# Patient Record
Sex: MALE | Race: WHITE | NOT HISPANIC OR LATINO | Employment: OTHER | URBAN - METROPOLITAN AREA
[De-identification: names, ages, dates, MRNs, and addresses within clinical notes are randomized per-mention and may not be internally consistent; named-entity substitution may affect disease eponyms.]

---

## 2017-05-28 DIAGNOSIS — I10 ESSENTIAL HYPERTENSION, BENIGN: ICD-10-CM

## 2017-05-30 RX ORDER — LISINOPRIL AND HYDROCHLOROTHIAZIDE 12.5; 1 MG/1; MG/1
TABLET ORAL
Qty: 90 TABLET | Refills: 2 | Status: SHIPPED | OUTPATIENT
Start: 2017-05-30

## 2017-08-16 DIAGNOSIS — E03.9 ACQUIRED HYPOTHYROIDISM: ICD-10-CM

## 2017-08-18 RX ORDER — LEVOTHYROXINE SODIUM 0.12 MG/1
TABLET ORAL
Qty: 90 TABLET | Refills: 0 | Status: SHIPPED | OUTPATIENT
Start: 2017-08-18

## 2019-10-20 ENCOUNTER — HOSPITAL ENCOUNTER (EMERGENCY)
Facility: HOSPITAL | Age: 69
End: 2019-10-20

## 2019-10-20 VITALS
HEART RATE: 110 BPM | SYSTOLIC BLOOD PRESSURE: 176 MMHG | WEIGHT: 310 LBS | BODY MASS INDEX: 44.86 KG/M2 | RESPIRATION RATE: 20 BRPM | OXYGEN SATURATION: 97 % | DIASTOLIC BLOOD PRESSURE: 75 MMHG | TEMPERATURE: 98.3 F

## 2022-08-17 ENCOUNTER — TELEPHONE (OUTPATIENT)
Dept: RADIATION ONCOLOGY | Facility: HOSPITAL | Age: 72
End: 2022-08-17

## 2022-08-18 ENCOUNTER — OFFICE VISIT (OUTPATIENT)
Dept: RADIATION ONCOLOGY | Facility: HOSPITAL | Age: 72
End: 2022-08-18

## 2022-08-18 ENCOUNTER — HOSPITAL ENCOUNTER (OUTPATIENT)
Dept: RADIATION ONCOLOGY | Facility: HOSPITAL | Age: 72
Setting detail: RADIATION/ONCOLOGY SERIES
Discharge: HOME | End: 2022-08-18

## 2022-08-18 VITALS
SYSTOLIC BLOOD PRESSURE: 135 MMHG | RESPIRATION RATE: 12 BRPM | WEIGHT: 189.8 LBS | OXYGEN SATURATION: 98 % | TEMPERATURE: 98 F | BODY MASS INDEX: 25.71 KG/M2 | HEIGHT: 72 IN | DIASTOLIC BLOOD PRESSURE: 80 MMHG | HEART RATE: 81 BPM

## 2022-08-18 DIAGNOSIS — C61 PROSTATE CANCER: Primary | ICD-10-CM

## 2022-08-18 PROCEDURE — G0463 HOSPITAL OUTPT CLINIC VISIT: HCPCS

## 2022-08-18 RX ORDER — MELOXICAM 15 MG/1
15 TABLET ORAL DAILY
COMMUNITY
Start: 2022-07-07

## 2022-08-18 NOTE — PROGRESS NOTES
CONSULTATION NOTE      :                                                          1950  DATE OF CONSULTATION:                       2022   REQUESTING PHYSICIAN:                   Sin Johnston MD  REASON FOR CONSULTATION:           Prostate cancer (HCC)  - Stage I (cT1c, cN0, cM0, PSA: 4.3, Grade Group: 1)         BRIEF HISTORY:  The patient is a very pleasant 72 y.o. male  with recent diagnosis of prostate cancer.  He presented with slowly rising PSA values increasing from 3.35 ng/ml on 2021, 4.85 ng/ml on 3/16/2022 and 4.33 ng/ml on 6/3/2022.  He has moderate urinary outflow obstructive symptoms.   MRI 2022 showed BPH with prostate volume estimated 45 cc.  There was a PI-RADS 4 index nodule in the right posterior lateral mid peripheral zone assuring 12 mm diameter.  There was a PI-RADS 3 index lesion in the right posterior medial peripheral zone measuring 6 mm diameter.  Biopsy was performed 2022.  MRI targeted biopsies of the 2 regions of interest were negative for neoplasm.  Random samples from the left lobe showed Shilpi's 3+3=6 in 1 of 6 cores with tumor occupying less than 5% of submitted tissue.  Random samples from the right lobe contained [place]'s 3+3=6 involving 2 out of 6 cores, 10% submitted tissue.  There was no evidence of perineural invasion or lymphovascular invasion.  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  He has longevity in his family with his mother doing well at age 94.  He was certainly have longevity much greater than 10 years to warrant definitive treatment, which he wishes to pursue.  He is interested in nonsurgical treatment approach.      Allergy:   Allergies   Allergen Reactions   • Penicillins Rash       Social History:   Social History     Socioeconomic History   • Marital status:    Tobacco Use   • Smoking status: Former Smoker   • Smokeless tobacco: Current User     Types: Chew   Vaping Use   • Vaping Use: Never used  "  Substance and Sexual Activity   • Alcohol use: Yes     Comment: 3 drinks per week   • Drug use: Defer   • Sexual activity: Defer       Past Medical History:   Past Medical History:   Diagnosis Date   • Acquired hypothyroidism     unspecified cause   • Essential hypertension     benign   • H/O colonoscopy 2008    normal results   • Pneumococcal vaccination given 2011   • Shingles 2011    vaccine   • Vaccine for tetanus toxoid 2011       Family History: family history includes Diabetes in his maternal grandmother; Heart attack in his father; Heart disease in his father.     Surgical History:   Past Surgical History:   Procedure Laterality Date   • COLONOSCOPY  2019   • KNEE SURGERY Right 1971   • PROSTATE BIOPSY          Review of Systems:   Review of Systems   Musculoskeletal: Positive for arthralgias.   All other systems reviewed and are negative.          Objective   VITAL SIGNS:   Vitals:    08/18/22 0931   BP: 135/80   Pulse: 81   Resp: 12   Temp: 98 °F (36.7 °C)   SpO2: 98%  Comment: RA   Weight: 86.1 kg (189 lb 12.8 oz)   Height: 182.9 cm (72\")   PainSc: 0-No pain        Karnofsky score: 90       Physical Exam:   Physical Exam  Vitals and nursing note reviewed.   Constitutional:       Appearance: He is well-developed.   HENT:      Head: Normocephalic and atraumatic.   Cardiovascular:      Rate and Rhythm: Normal rate and regular rhythm.      Heart sounds: Normal heart sounds. No murmur heard.  Pulmonary:      Effort: Pulmonary effort is normal.      Breath sounds: Normal breath sounds. No wheezing or rales.   Chest:   Breasts:      Right: No supraclavicular adenopathy.      Left: No supraclavicular adenopathy.       Abdominal:      General: Bowel sounds are normal. There is no distension.      Palpations: Abdomen is soft.      Tenderness: There is no abdominal tenderness.   Genitourinary:     Prostate: Enlarged ( 50 to 60 cc, smooth, symmetric enlarged.  No induration or nodules.  Seminal vesicles are " nonpalpable.). Not tender and no nodules present.      Rectum: No mass, tenderness, external hemorrhoid or internal hemorrhoid. Normal anal tone.   Musculoskeletal:         General: No tenderness. Normal range of motion.      Cervical back: Normal range of motion and neck supple.   Lymphadenopathy:      Cervical: No cervical adenopathy.      Upper Body:      Right upper body: No supraclavicular adenopathy.      Left upper body: No supraclavicular adenopathy.   Skin:     General: Skin is warm and dry.   Neurological:      Mental Status: He is alert and oriented to person, place, and time.      Sensory: No sensory deficit.   Psychiatric:         Behavior: Behavior normal.         Thought Content: Thought content normal.         Judgment: Judgment normal.                 IPSS Questionnaire (AUA-7):  Over the past month…    1)  Incomplete Emptying  How often have you had a sensation of not emptying your bladder?  0 - Not at all   2)  Frequency  How often have you had to urinate less than every two hours? 1 - Less than 1 time in 5   3)  Intermittency  How often have you found you stopped and started again several times when you urinated?  1 - Less than 1 time in 5   4) Urgency  How often have you found it difficult to postpone urination?  0 - Not at all   5) Weak Stream  How often have you had a weak urinary stream?  0 - Not at all   6) Straining  How often have you had to push or strain to begin urination?  0 - Not at all   7) Nocturia  How many times did you typically get up at night to urinate?  1 - 1 time   Total Score:  3       Quality of life due to urinary symptoms:  If you were to spend the rest of your life with your urinary condition the way it is now, how would you feel about that? 3-Mixed   Urine Leakage (Incontinence) 0-No Leakage     Sexual Health Inventory  Current Status    1)  How do you rate your confidence that you could achieve and keep an erection? 4-High   2) When you had erections with sexual  stimulation, how often were your erections hard enough for penetration (entering your partner)? 5-Almost always or always   3)  During sexual intercourse, how often were you able to maintain your erection after you had penetrated (entered) into your partner? 5-Almost always or always   4) During sexual intercourse, how difficult was it to maintain your erection to completion of intercourse? 5-Not difficult   5) When you attempted sexual intercourse, how often was it satisfactory to you? 5-Almost always or always   Total Score: 24       Bowel Health Inventory  Current Status: 0-No problems, no rectal bleeding, no discharge, less then 5 bowel movements a day           The following portions of the patient's history were reviewed and updated as appropriate: allergies, current medications, past family history, past medical history, past social history, past surgical history and problem list.    Assessment:   Assessment      Prostate cancer, Shilpi's 3+3=6, clinical stage I (T1c, N0, M0, PSA 4.33 ng/ml.  He has low risk disease and prognosis should be excellent.  He is not comfortable with active surveillance and wishes to pursue definitive treatment.  We reviewed the radiotherapy treatment options of stereotactic body radiotherapy versus intensity modulated radiotherapy versus brachytherapy.  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  We reviewed the CyberKnife treatment procedure in detail today.  Informed consent was obtained.    RECOMMENDATIONS: We will arrange for placement of gold seed fiducials in early November 2022.  Patient will subsequently return here for treatment planning CT and MRI pelvis.  The prostate gland will receive 5 daily fractions of 7 Hines each, delivered on the CyberKnife.  He will likely receive treatment shortly after Thanksgiving holiday week.      I spent a total of 55 minutes on todays visit, with more than 45 minutes in  direct face to face communication, and the remainder of the time spent in reviewing the relevant history, records, available imaging, and for documentation.    Follow Up:   Return in about 3 months (around 11/18/2022) for Office Visit, Simulation.  Diagnoses and all orders for this visit:    1. Prostate cancer (HCC) (Primary)  -     Ambulatory Referral to Urology         Terry Andrews MD

## 2022-09-13 ENCOUNTER — OFFICE VISIT (OUTPATIENT)
Dept: UROLOGY | Facility: CLINIC | Age: 72
End: 2022-09-13

## 2022-09-13 VITALS — HEIGHT: 72 IN | BODY MASS INDEX: 25.6 KG/M2 | WEIGHT: 189 LBS

## 2022-09-13 DIAGNOSIS — C61 PROSTATE CANCER: Primary | ICD-10-CM

## 2022-09-13 PROCEDURE — 99204 OFFICE O/P NEW MOD 45 MIN: CPT | Performed by: UROLOGY

## 2022-09-13 RX ORDER — CIPROFLOXACIN 750 MG/1
750 TABLET, FILM COATED ORAL 2 TIMES DAILY
Qty: 6 TABLET | Refills: 0 | Status: SHIPPED | OUTPATIENT
Start: 2022-09-13 | End: 2022-11-02

## 2022-09-13 NOTE — PROGRESS NOTES
Prostate Cancer Office Visit      Patient Name: Michael Simon  : 1950   MRN: [id number]     Chief Complaint:  Prostate Cancer.     History of Present Illness: Michael Simon is a 72 y.o. male who presents today with recently diagnosed prostate cancer.  The patient has been diagnosed with grade group 1, Shilpi 3+3 equal 6 prostate cancer.  Prebiopsy PSA 4.3.  The patient desires definitive therapy.  He presents today as a referral from Dr. Terry Andrews for to discuss fiducial marker placement prior to CyberKnife radiotherapy.      Subjective      Review of System: Review of Systems   Constitutional: Negative for chills, fatigue, fever and unexpected weight change.   HENT: Negative for sore throat.    Eyes: Negative for visual disturbance.   Respiratory: Negative for cough, chest tightness and shortness of breath.    Cardiovascular: Negative for chest pain and leg swelling.   Gastrointestinal: Negative for blood in stool, constipation, diarrhea, nausea, rectal pain and vomiting.   Genitourinary: Negative for decreased urine volume, difficulty urinating, dysuria, enuresis, flank pain, frequency, genital sores, hematuria and urgency.   Musculoskeletal: Negative for back pain and joint swelling.   Skin: Negative for rash and wound.   Neurological: Negative for seizures, speech difficulty, weakness and headaches.   Psychiatric/Behavioral: Negative for confusion, sleep disturbance and suicidal ideas. The patient is not nervous/anxious.       I have reviewed the ROS documented by my clinical staff, I have updated appropriately and I agree. Brendan Hernández MD    Past Medical History:   Past Medical History:   Diagnosis Date   • Acquired hypothyroidism     unspecified cause   • Elevated PSA    • Essential hypertension     benign   • H/O colonoscopy     normal results   • Kidney stone    • Pneumococcal vaccination given    • Prostate cancer (HCC)    • Shingles 2011    vaccine   •  "Vaccine for tetanus toxoid        Past Surgical History:   Past Surgical History:   Procedure Laterality Date   • COLONOSCOPY  2019   • KNEE SURGERY Right 1971   • PROSTATE BIOPSY         Family History:   Family History   Problem Relation Age of Onset   • Heart attack Father    • Heart disease Father    • Diabetes Maternal Grandmother         type 2       Social History:   Social History     Socioeconomic History   • Marital status:    Tobacco Use   • Smoking status: Former Smoker     Years: 3.00     Start date: 1969     Quit date: 1972     Years since quittin.7   • Smokeless tobacco: Current User     Types: Chew   Vaping Use   • Vaping Use: Never used   Substance and Sexual Activity   • Alcohol use: Yes     Alcohol/week: 3.0 standard drinks     Types: 2 Cans of beer, 1 Drinks containing 0.5 oz of alcohol per week     Comment: 3 drinks per week   • Drug use: Never   • Sexual activity: Yes     Partners: Female       Medications:     Current Outpatient Medications:   •  levothyroxine (SYNTHROID, LEVOTHROID) 125 MCG tablet, TAKE 1 TABLET BY MOUTH DAILY., Disp: 90 tablet, Rfl: 0  •  lisinopril-hydrochlorothiazide (PRINZIDE,ZESTORETIC) 10-12.5 MG per tablet, TAKE 1 TABLET BY MOUTH EVERY DAY, Disp: 90 tablet, Rfl: 2  •  meloxicam (MOBIC) 15 MG tablet, Take 15 mg by mouth Daily., Disp: , Rfl:   •  ciprofloxacin (CIPRO) 750 MG tablet, Take 1 tablet by mouth 2 (Two) Times a Day., Disp: 6 tablet, Rfl: 0    Allergies:   Allergies   Allergen Reactions   • Penicillins Rash       Objective     Physical Exam:   Vital Signs:   Vitals:    22 1614   Weight: 85.7 kg (189 lb)   Height: 182.9 cm (72.01\")   PainSc: 0-No pain     Body mass index is 25.63 kg/m².     Physical Exam  Vitals and nursing note reviewed.   Constitutional:       Appearance: Normal appearance.   HENT:      Head: Normocephalic and atraumatic.   Cardiovascular:      Comments: Well perfused  Pulmonary:      Effort: Pulmonary effort is " normal.   Abdominal:      General: Abdomen is flat.      Palpations: Abdomen is soft.   Musculoskeletal:         General: Normal range of motion.   Skin:     General: Skin is warm and dry.   Neurological:      General: No focal deficit present.      Mental Status: He is alert and oriented to person, place, and time. Mental status is at baseline.   Psychiatric:         Mood and Affect: Mood normal.         Behavior: Behavior normal.         Thought Content: Thought content normal.         Judgment: Judgment normal.         Labs:   Lab Results   Component Value Date    PSA 3.3 06/16/2016       Lab Results   Component Value Date    WBC 8.1 06/16/2016    HGB 16.9 06/16/2016    HCT 50.7 06/16/2016    MCV 84 06/16/2016     06/16/2016       Lab Results   Component Value Date    GLUCOSE 99 06/16/2016    BUN 18 06/16/2016    CREATININE 0.88 06/16/2016    EGFRIFNONA 90 06/16/2016    EGFRIFAFRI 103 06/16/2016    BCR 20 06/16/2016    K 4.8 06/16/2016    CO2 30 (H) 06/16/2016    CALCIUM 9.5 06/16/2016    PROTENTOTREF 6.8 06/16/2016    ALBUMIN 4.4 06/16/2016    LABIL2 1.8 06/16/2016    AST 26 06/16/2016    ALT 29 06/16/2016       Images:   No Images in the past 120 days found..    Measures:   Tobacco:   Michael Simon  reports that he quit smoking about 50 years ago. He started smoking about 53 years ago. He quit after 3.00 years of use. His smokeless tobacco use includes chew.. I have educated him on the risk of diseases from using tobacco products.    Assessment / Plan      Assessment:   Mr. Michael Simon is a 72 y.o. who presents today with recently diagnosed prostate cancer.  The patient has been diagnosed with grade group 1, Shilpi 3+3 equal 6 prostate cancer.  Prebiopsy PSA 4.3.  The patient desires definitive therapy.  He presents today as a referral from Dr. Terry Andrews for to discuss fiducial marker placement prior to CyberKnife radiotherapy.     Today we have discussed the risks and benefits of fiducial  marker placement.  We have discussed the indication for this procedure.  We have discussed specific risks including infection and the indication for periprocedural antibiotic therapy.  We will prescribe antibiotic today.  We have discussed that he will continue follow-up for treatment with Dr. Terry Andrews after marker placement.  He is understanding agreeable.    Diagnoses and all orders for this visit:    1. Prostate cancer (HCC) (Primary)  -     ciprofloxacin (CIPRO) 750 MG tablet; Take 1 tablet by mouth 2 (Two) Times a Day.  Dispense: 6 tablet; Refill: 0       Follow Up:   Return in about 6 weeks (around 10/26/2022).    I spent approximately 45 minutes providing clinical care for this patient; including review of patient's chart and provider documentation, face to face time spent with patient in examination room (obtaining history, performing physical exam, discussing diagnosis and management options), placing orders, and completing patient documentation.     Brendan Hernández MD  [facility]

## 2022-09-14 DIAGNOSIS — C61 PROSTATE CANCER: Primary | ICD-10-CM

## 2022-10-26 ENCOUNTER — PROCEDURE VISIT (OUTPATIENT)
Dept: UROLOGY | Facility: CLINIC | Age: 72
End: 2022-10-26

## 2022-10-26 VITALS — HEIGHT: 72 IN | BODY MASS INDEX: 25.6 KG/M2 | WEIGHT: 189 LBS

## 2022-10-26 DIAGNOSIS — C61 PROSTATE CANCER: Primary | ICD-10-CM

## 2022-10-26 PROCEDURE — 76942 ECHO GUIDE FOR BIOPSY: CPT | Performed by: UROLOGY

## 2022-10-26 PROCEDURE — A4648 IMPLANTABLE TISSUE MARKER: HCPCS | Performed by: UROLOGY

## 2022-10-26 PROCEDURE — 55876 PLACE RT DEVICE/MARKER PROS: CPT | Performed by: UROLOGY

## 2022-10-26 NOTE — PROGRESS NOTES
Preprocedure diagnosis  Prostate Cancer      Postprocedure diagnosis  Prostate Cancer      Procedure  1. Transrectal Ultrasound Guided Placement of Gold Fiducial Markers  2. Total Number of Markers Placed: 6     Attending surgeon  Brendan Hernández     Anesthesia  2% lidocaine injected pascual-prostatic       Complications  None     Indications  72 y.o. male with a history of prostate cancer.  Patient Plan to undergo CyberKnife therapy with Dr. Andrews. He presents today for placement of gold fiducial marker to guide radiation therapy planning.  Informed consent was reviewed and signed after discussion of risks, benefits, alternatives.     Findings  Uncomplicated placement of 6 gold fiducial markers, placement performed at left and right lateral base, left and right lateral mid gland, left and right apex.      Procedure  The patient was identified, informed consent was reviewed and signed. Confirmation that the patient has been taking his preprocedure antibiotics was completed.  He was placed in the left lateral position, with knees to chest.  The ultrasound probe was inserted into the patient's rectum.  The prostate was identified.  5 mL of 2% lidocaine was injected along the neurovascular bundle in the left side and a similar  anesthetic injection was performed on the left side.  Starting within the left base of the prostate a marker was placed in the lateral portion of the prostate at the base, mid, apex of the gland. Next, starting in the right base of the prostate a marker was placed in the lateral portion of the prostate at the base, mid, apex of the gland.  A total of 6 gold markers were used.  No significant bleeding was encountered.  The rectal probe was held in place for 3 minutes to confirm hemostasis.  The rectal probe was removed and there was no significant blood per rectum noted.  The patient tolerated the procedure well denies significant pain or discomfort.     Follow Up:   -Follow up with Radiation  Oncology as planned for radiation therapy planning/treatment.   -Return precautions discussed, including significant hematuria, significant blood per rectum, fevers, chills, nausea, vomiting. Patient was instructed to call my office or present to the nearest emergency department with these concerns.

## 2022-10-27 ENCOUNTER — DOCUMENTATION (OUTPATIENT)
Dept: NUTRITION | Facility: HOSPITAL | Age: 72
End: 2022-10-27

## 2022-11-01 ENCOUNTER — TELEPHONE (OUTPATIENT)
Dept: RADIATION ONCOLOGY | Facility: HOSPITAL | Age: 72
End: 2022-11-01

## 2022-11-01 NOTE — TELEPHONE ENCOUNTER
Reminder call to pt about appointment times for tomorrow.  Clinic @ 10:30am, CT sim @ 11am and MRI @ 12:45pm.   Pt verbalized understanding.

## 2022-11-02 ENCOUNTER — HOSPITAL ENCOUNTER (OUTPATIENT)
Dept: RADIATION ONCOLOGY | Facility: HOSPITAL | Age: 72
Discharge: HOME | End: 2022-11-02

## 2022-11-02 ENCOUNTER — OFFICE VISIT (OUTPATIENT)
Dept: RADIATION ONCOLOGY | Facility: HOSPITAL | Age: 72
End: 2022-11-02

## 2022-11-02 ENCOUNTER — HOSPITAL ENCOUNTER (OUTPATIENT)
Dept: MRI IMAGING | Facility: HOSPITAL | Age: 72
Discharge: HOME | End: 2022-11-02

## 2022-11-02 ENCOUNTER — HOSPITAL ENCOUNTER (OUTPATIENT)
Dept: RADIATION ONCOLOGY | Facility: HOSPITAL | Age: 72
Setting detail: RADIATION/ONCOLOGY SERIES
Discharge: HOME | End: 2022-11-02

## 2022-11-02 VITALS
DIASTOLIC BLOOD PRESSURE: 78 MMHG | HEART RATE: 90 BPM | BODY MASS INDEX: 26.74 KG/M2 | HEIGHT: 71 IN | OXYGEN SATURATION: 97 % | WEIGHT: 191 LBS | SYSTOLIC BLOOD PRESSURE: 139 MMHG | RESPIRATION RATE: 16 BRPM

## 2022-11-02 DIAGNOSIS — C61 PROSTATE CANCER: Primary | ICD-10-CM

## 2022-11-02 DIAGNOSIS — C61 PROSTATE CANCER: ICD-10-CM

## 2022-11-02 PROCEDURE — 72195 MRI PELVIS W/O DYE: CPT

## 2022-11-02 PROCEDURE — 77399 UNLISTED PX MED RADJ PHYSICS: CPT | Performed by: RADIOLOGY

## 2022-11-02 RX ORDER — TAMSULOSIN HYDROCHLORIDE 0.4 MG/1
1 CAPSULE ORAL NIGHTLY
Qty: 30 CAPSULE | Refills: 11 | Status: SHIPPED | OUTPATIENT
Start: 2022-11-02

## 2022-11-02 NOTE — PROGRESS NOTES
RE-EVALUATION    PATIENT:                                                      Michael Simon  :                                                          1950  DATE:                          2022   DIAGNOSIS:     Prostate cancer (HCC)  - Stage I (cT1c, cN0, cM0, PSA: 4.3, Grade Group: 1)         BRIEF HISTORY:  The patient is a very pleasant 72 y.o. male with low risk prostate cancer.     He underwent placement of gold seed fiducials last week without difficulty.  No change in voiding.  No hematuria or dysuria.   He has had no change in health since informed consent was obtained on 2022   He remains a good candidate for SBRT.  He returns today for simulation.  He has been compliant with all prep and restrictions.      Allergies   Allergen Reactions   • Penicillins Rash       Review of Systems   All other systems reviewed and are negative.           IPSS Questionnaire (AUA-7):  Over the past month…     1)  Incomplete Emptying  How often have you had a sensation of not emptying your bladder?  0 - Not at all   2)  Frequency  How often have you had to urinate less than every two hours? 1 - Less than 1 time in 5   3)  Intermittency  How often have you found you stopped and started again several times when you urinated?  1 - Less than 1 time in 5   4) Urgency  How often have you found it difficult to postpone urination?  0 - Not at all   5) Weak Stream  How often have you had a weak urinary stream?  0 - Not at all   6) Straining  How often have you had to push or strain to begin urination?  0 - Not at all   7) Nocturia  How many times did you typically get up at night to urinate?  1 - 1 time   Total Score:  3         Quality of life due to urinary symptoms:  If you were to spend the rest of your life with your urinary condition the way it is now, how would you feel about that? 3-Mixed   Urine Leakage (Incontinence) 0-No Leakage      Sexual Health Inventory  Current Status     1)  How do you rate your  "confidence that you could achieve and keep an erection? 4-High   2) When you had erections with sexual stimulation, how often were your erections hard enough for penetration (entering your partner)? 5-Almost always or always   3)  During sexual intercourse, how often were you able to maintain your erection after you had penetrated (entered) into your partner? 5-Almost always or always   4) During sexual intercourse, how difficult was it to maintain your erection to completion of intercourse? 5-Not difficult   5) When you attempted sexual intercourse, how often was it satisfactory to you? 5-Almost always or always   Total Score: 24         Bowel Health Inventory  Current Status: 0-No problems, no rectal bleeding, no discharge, less then 5 bowel movements a day                 Objective   VITAL SIGNS:   Vitals:    11/02/22 1014   BP: 139/78   Pulse: 90   Resp: 16   SpO2: 97%  Comment: RA   Weight: 86.6 kg (191 lb)   Height: 180.3 cm (71\")   PainSc: 0-No pain        KPS 90%      Physical Exam  Vitals and nursing note reviewed.   Constitutional:       General: He is not in acute distress.     Appearance: Normal appearance. He is well-developed.   HENT:      Head: Normocephalic and atraumatic.   Eyes:      Conjunctiva/sclera: Conjunctivae normal.      Pupils: Pupils are equal, round, and reactive to light.   Cardiovascular:      Rate and Rhythm: Normal rate and regular rhythm.   Pulmonary:      Effort: Pulmonary effort is normal.      Breath sounds: Normal breath sounds.   Musculoskeletal:         General: Normal range of motion.      Cervical back: Normal range of motion.   Skin:     General: Skin is warm and dry.   Neurological:      Mental Status: He is alert and oriented to person, place, and time.   Psychiatric:         Behavior: Behavior normal.         Thought Content: Thought content normal.         Judgment: Judgment normal.            The following portions of the patient's history were reviewed and updated as " appropriate: allergies, current medications, past family history, past medical history, past social history, past surgical history and problem list.    Diagnoses and all orders for this visit:    Prostate cancer (HCC)  -     tamsulosin (FLOMAX) 0.4 MG capsule 24 hr capsule; Take 1 capsule by mouth Every Night. Begin first dose 2 nights prior to radiation.      IMPRESSION:  Prostate cancer, Shilpi's 3+3=6, clinical stage I (T1c, N0, M0), PSA 4.33 ng/ml.    RECOMMENDATIONS:  He will undergo treatment planning CT and MRI pelvis today.  The prostate gland will receive 5 daily fractions of 7 Hines each, delivered on the CyberKnife.  He will likely undergo treatment shortly after Thanksgiving holiday week.         Xavi Evans, APRN

## 2022-11-11 PROCEDURE — 77301 RADIOTHERAPY DOSE PLAN IMRT: CPT | Performed by: RADIOLOGY

## 2022-11-11 PROCEDURE — 77338 DESIGN MLC DEVICE FOR IMRT: CPT | Performed by: RADIOLOGY

## 2022-11-11 PROCEDURE — 77300 RADIATION THERAPY DOSE PLAN: CPT | Performed by: RADIOLOGY

## 2022-11-28 ENCOUNTER — HOSPITAL ENCOUNTER (OUTPATIENT)
Dept: RADIATION ONCOLOGY | Facility: HOSPITAL | Age: 72
Discharge: HOME | End: 2022-11-28

## 2022-11-28 PROCEDURE — 77373 STRTCTC BDY RAD THER TX DLVR: CPT | Performed by: RADIOLOGY

## 2022-11-29 ENCOUNTER — HOSPITAL ENCOUNTER (OUTPATIENT)
Dept: RADIATION ONCOLOGY | Facility: HOSPITAL | Age: 72
Discharge: HOME | End: 2022-11-29

## 2022-11-29 PROCEDURE — 77373 STRTCTC BDY RAD THER TX DLVR: CPT | Performed by: RADIOLOGY

## 2022-11-30 ENCOUNTER — HOSPITAL ENCOUNTER (OUTPATIENT)
Dept: RADIATION ONCOLOGY | Facility: HOSPITAL | Age: 72
Discharge: HOME | End: 2022-11-30

## 2022-11-30 PROCEDURE — 77373 STRTCTC BDY RAD THER TX DLVR: CPT | Performed by: RADIOLOGY

## 2022-12-01 ENCOUNTER — HOSPITAL ENCOUNTER (OUTPATIENT)
Dept: RADIATION ONCOLOGY | Facility: HOSPITAL | Age: 72
Discharge: HOME | End: 2022-12-01

## 2022-12-01 ENCOUNTER — HOSPITAL ENCOUNTER (OUTPATIENT)
Dept: RADIATION ONCOLOGY | Facility: HOSPITAL | Age: 72
Setting detail: RADIATION/ONCOLOGY SERIES
Discharge: HOME | End: 2022-12-01
Payer: MEDICARE

## 2022-12-01 PROCEDURE — 77373 STRTCTC BDY RAD THER TX DLVR: CPT | Performed by: RADIOLOGY

## 2022-12-02 ENCOUNTER — HOSPITAL ENCOUNTER (OUTPATIENT)
Dept: RADIATION ONCOLOGY | Facility: HOSPITAL | Age: 72
Discharge: HOME | End: 2022-12-02

## 2022-12-02 PROCEDURE — 77373 STRTCTC BDY RAD THER TX DLVR: CPT | Performed by: RADIOLOGY

## 2022-12-02 PROCEDURE — 77336 RADIATION PHYSICS CONSULT: CPT | Performed by: RADIOLOGY

## 2023-01-18 ENCOUNTER — OFFICE VISIT (OUTPATIENT)
Dept: RADIATION ONCOLOGY | Facility: HOSPITAL | Age: 73
End: 2023-01-18
Payer: MEDICARE

## 2023-01-18 ENCOUNTER — HOSPITAL ENCOUNTER (OUTPATIENT)
Dept: RADIATION ONCOLOGY | Facility: HOSPITAL | Age: 73
Setting detail: RADIATION/ONCOLOGY SERIES
Discharge: HOME | End: 2023-01-18
Payer: MEDICARE

## 2023-01-18 DIAGNOSIS — C61 PROSTATE CANCER: Primary | ICD-10-CM

## 2023-01-18 NOTE — PROGRESS NOTES
TELEMEDICINE FOLLOW UP NOTE    PATIENT:                                                      Michael Simon  MEDICAL RECORD #:                        [id number]  :                                                          1950  COMPLETION DATE:   2022  DIAGNOSIS:     Prostate cancer (HCC)  - Stage I (cT1c, cN0, cM0, PSA: 4.3, Grade Group: 1)    This visit has been converted to a telehealth virtual visit.  Total time of discussion was 22 minutes.  The patient has given verbal consent.     COVID-19 RISK SCREEN      1. Has the patient had close contact or exposure without PPE with a lab confirmed COVID-19 (+) person or a person under investigation (PUI) for COVID-19 infection?  -- No      2. Has the patient had respiratory symptoms, worsened/new cough and/or SOA, unexplained fever, or sudden loss of smell and/or taste in the past week? --  No     3. Has the patient completed COVID vaccination? --  Yes       BRIEF HISTORY:    Initial follow up visit for low right prostate cancer.  He underwent definitive treatment to the prostate with CyberKnife stereotactic body radiotherapy.  He tolerated treatment well.  He developed mild fatigue following treatment but continued to remain very [place].  He reports mild exacerbation of increased urinary frequency, urgency, and nocturia x2-3.  He reports urinary outflow obstructive symptoms are subsiding.  He denies hematuria, dysuria, incontinence, or additional acute urinary complaints otherwise.  He reports urine stream is strong.  No hesitancy or intermittency with stream.  He continues nightly Flomax.  He denies change in bowel function.  He reports erectile function is satisfactory.  No new aches or pains.  Overall, he feels well.      IPSS Questionnaire (AUA-7):  Over the past month…    1)  Incomplete Emptying  How often have you had a sensation of not emptying your bladder?  0 - Not at all   2)  Frequency  How often have you had to urinate less than  every two hours? 2 - Less than half the time   3)  Intermittency  How often have you found you stopped and started again several times when you urinated?  0 - Not at all   4) Urgency  How often have you found it difficult to postpone urination?  2 - Less than half the time   5) Weak Stream  How often have you had a weak urinary stream?  0 - Not at all   6) Straining  How often have you had to push or strain to begin urination?  0 - Not at all   7) Nocturia  How many times did you typically get up at night to urinate?  3 - 3 times   Total Score:  7       Quality of life due to urinary symptoms:  If you were to spend the rest of your life with your urinary condition the way it is now, how would you feel about that? 3-Mixed   Urine Leakage (Incontinence) 0-No Leakage     Sexual Health Inventory  Current Status    1)  How do you rate your confidence that you could achieve and keep an erection? 4-High   2) When you had erections with sexual stimulation, how often were your erections hard enough for penetration (entering your partner)? 5-Almost always or always   3)  During sexual intercourse, how often were you able to maintain your erection after you had penetrated (entered) into your partner? 5-Almost always or always   4) During sexual intercourse, how difficult was it to maintain your erection to completion of intercourse? 5-Not difficult   5) When you attempted sexual intercourse, how often was it satisfactory to you? 5-Almost always or always   Total Score: 24       Bowel Health Inventory  Current Status: 0-No problems, no rectal bleeding, no discharge, less then 5 bowel movements a day           MEDICATIONS: Medication reconciliation for the patient was reviewed and confirmed in the electronic medical record.    Review of Systems - Oncology        KPS 90%      Physical Exam  Pulmonary:      Respirations even, unlabored. No audible wheezing or cough.  Neurological:      A+Ox4, conversant, answers questions  appropriately.  Psychiatric:     Judgement, affect, and decision-making WNL.    Limited physical exam as visit was conducted remotely via telephone in light of the COVID-19 pandemic.            The following portions of the patient's history were reviewed and updated as appropriate: allergies, current medications, past family history, past medical history, past social history, past surgical history and problem list.         Diagnoses and all orders for this visit:    1. Prostate cancer (HCC) (Primary)         IMPRESSION:  Prostate cancer, Clinton's 3+3=6, clinical stage I (T1c, N0, M0), PSA 4.33 ng/ml.  6 weeks status post CyberKnife SBRT.  He tolerated treatment well.  Acute grade 1 fatigue and grade 1 urinary side effects are appropriately subsiding.  He did not develop acute radiation related toxicities otherwise.  We discussed plan to taper/discontinue Flomax, as tolerated.    Mr. Simon and I have reviewed the survivorship care plan in detail.  We discussed diagnosis, follow-up intervals, biannual PSA monitoring, and expectations for response to treatment.  A copy of the care plan has been mailed to the patient.  A copy has also been sent to his PCP.      RECOMMENDATIONS:  Mr. Simon continues routine oncologic surveillance under the care of Dr. Sin Johnston, with follow up and repeat PSA in March 2023.  We will schedule a 6-month follow up visit.      Return in about 6 months (around 7/18/2023) for Office Visit.    AGNES Lopez spent a total of 40 minutes on today's visit, with more than 22 minutes in virtual communication with the patient via telephone, and the remainder of the time spent in reviewing the relevant history, records, available imaging, and for documentation.

## 2023-07-28 ENCOUNTER — HOSPITAL ENCOUNTER (OUTPATIENT)
Dept: RADIATION ONCOLOGY | Facility: HOSPITAL | Age: 73
Setting detail: RADIATION/ONCOLOGY SERIES
Discharge: HOME | End: 2023-07-28
Payer: MEDICARE

## 2023-07-28 ENCOUNTER — OFFICE VISIT (OUTPATIENT)
Dept: RADIATION ONCOLOGY | Facility: HOSPITAL | Age: 73
End: 2023-07-28
Payer: MEDICARE

## 2023-07-28 VITALS
TEMPERATURE: 96.9 F | SYSTOLIC BLOOD PRESSURE: 139 MMHG | WEIGHT: 201.6 LBS | BODY MASS INDEX: 28.12 KG/M2 | HEART RATE: 75 BPM | RESPIRATION RATE: 16 BRPM | OXYGEN SATURATION: 95 % | DIASTOLIC BLOOD PRESSURE: 78 MMHG

## 2023-07-28 DIAGNOSIS — C61 PROSTATE CANCER: Primary | ICD-10-CM

## 2023-07-28 PROCEDURE — G0463 HOSPITAL OUTPT CLINIC VISIT: HCPCS

## 2023-07-28 NOTE — PROGRESS NOTES
FOLLOW UP NOTE    PATIENT:                                                      Michael Simon  MEDICAL RECORD #:                        [id number]  :                                                          1950  COMPLETION DATE:   2022  DIAGNOSIS:     Prostate cancer  - Stage I (cT1c, cN0, cM0, PSA: 4.3, Grade Group: 1)      BRIEF HISTORY:    Routine follow-up visit for low risk prostate cancer.  He underwent definitive treatment with CyberKnife stereotactic body radiotherapy.  He tolerated treatment well.  Acute grade 1 urinary side effects appropriately resolved following treatment.  At this point, he reports minimal residual lower urinary tract symptoms that are stable to slightly improved from baseline.  No hematuria, dysuria, or urinary incontinence.  He is no longer on nightly Flomax.  He reports bowel function is normal.  No acute GI complaints.  He reports high erectile function.  No new or focal bony pains.  No unexplained weight loss.  Overall, he feels well.  Initial posttreatment fatigue was 2.19 ng/mL on 3/15/2023.      IPSS Questionnaire (AUA-7):  Over the past month…    1)  Incomplete Emptying  How often have you had a sensation of not emptying your bladder?  1 - Less than 1 time in 5   2)  Frequency  How often have you had to urinate less than every two hours? 1 - Less than 1 time in 5   3)  Intermittency  How often have you found you stopped and started again several times when you urinated?  0 - Not at all   4) Urgency  How often have you found it difficult to postpone urination?  1 - Less than 1 time in 5   5) Weak Stream  How often have you had a weak urinary stream?  0 - Not at all   6) Straining  How often have you had to push or strain to begin urination?  0 - Not at all   7) Nocturia  How many times did you typically get up at night to urinate?  1 - 1 time   Total Score:  4       Quality of life due to urinary symptoms:  If you were to spend the rest of your life with your  I am not seeing any documentation from this EVISIT.    Sending to Dr. Stout.  Pended PT referral.  SEBAS Lester     urinary condition the way it is now, how would you feel about that? 1-Pleased   Urine Leakage (Incontinence) 0-No Leakage     Sexual Health Inventory  Current Status    1)  How do you rate your confidence that you could achieve and keep an erection? 4-High   2) When you had erections with sexual stimulation, how often were your erections hard enough for penetration (entering your partner)? 5-Almost always or always   3)  During sexual intercourse, how often were you able to maintain your erection after you had penetrated (entered) into your partner? 5-Almost always or always   4) During sexual intercourse, how difficult was it to maintain your erection to completion of intercourse? 5-Not difficult   5) When you attempted sexual intercourse, how often was it satisfactory to you? 5-Almost always or always   Total Score: 24       Bowel Health Inventory  Current Status: 0-No problems, no rectal bleeding, no discharge, less then 5 bowel movements a day       MEDICATIONS: Medication reconciliation for the patient was reviewed and confirmed in the electronic medical record.    Review of Systems   All other systems reviewed and are negative.        KPS 90%      Physical Exam  Vitals and nursing note reviewed.   Constitutional:       General: He is not in acute distress.     Appearance: He is well-developed.   HENT:      Head: Normocephalic and atraumatic.   Eyes:      Conjunctiva/sclera: Conjunctivae normal.      Pupils: Pupils are equal, round, and reactive to light.   Cardiovascular:      Rate and Rhythm: Normal rate and regular rhythm.   Pulmonary:      Effort: Pulmonary effort is normal.      Breath sounds: Normal breath sounds.   Genitourinary:     Prostate: Enlarged (30 cc (previously 50-60 cc), smooth, wide, symmetric.  No induration or nodules.  Seminal vesicles are nonpalpable.). Not tender and no nodules present.      Rectum: No mass, external hemorrhoid or internal hemorrhoid. Normal anal tone.   Musculoskeletal:          General: Normal range of motion.   Skin:     General: Skin is warm and dry.   Neurological:      Mental Status: He is alert and oriented to person, place, and time.   Psychiatric:         Behavior: Behavior normal.         Thought Content: Thought content normal.         Judgment: Judgment normal.       VITAL SIGNS:   Vitals:    07/28/23 1023   BP: 139/78   Pulse: 75   Resp: 16   Temp: 96.9 °F (36.1 °C)   TempSrc: Temporal   SpO2: 95%   Weight: 91.4 kg (201 lb 9.6 oz)   PainSc: 0-No pain             The following portions of the patient's history were reviewed and updated as appropriate: allergies, current medications, past family history, past medical history, past social history, past surgical history and problem list.         Diagnoses and all orders for this visit:    1. Prostate cancer (Primary)         IMPRESSION:  Prostate cancer, Shilpi's 3+3=6, clinical stage I (T1c, N0, M0), PSA 4.33 ng/ml.  8 months status post CyberKnife SBRT.  He tolerated treatment well.  No symptomology to suggest late radiation related toxicities at this point.  He has had an excellent clinical and biochemical response to treatment, with downsizing of the prostate and PSA decline to 2.19 ng/mL.  We discussed that PSA should likely continue to decline toward target yohana value <0.5 ng/mL.  We discussed typical timeframe is usually 1 to 2 years posttreatment, but sometimes low volume, low risk prostate cancers can take a bit longer.  Prognosis should remain excellent for this patient.    RECOMMENDATIONS: Mr. Simon continues routine urologic surveillance and biannual PSA monitoring under the care of Dr. Sin Johnston, with next PSA expected in September 2023.  We will schedule the patient to return to our clinic in approximately 1 year or sooner as needed.      Return in about 1 year (around 7/28/2024) for Office Visit.    AGNES Lopez spent a total of 35 minutes on today's visit, with more than 20 minutes  in direct face to face communication, and the remainder of the time spent in reviewing the relevant history, records, available imaging, and for documentation.

## 2024-08-26 ENCOUNTER — OFFICE VISIT (OUTPATIENT)
Dept: RADIATION ONCOLOGY | Facility: HOSPITAL | Age: 74
End: 2024-08-26
Payer: MEDICARE

## 2024-08-26 ENCOUNTER — HOSPITAL ENCOUNTER (OUTPATIENT)
Dept: RADIATION ONCOLOGY | Facility: HOSPITAL | Age: 74
Setting detail: RADIATION/ONCOLOGY SERIES
Discharge: HOME | End: 2024-08-26
Payer: MEDICARE

## 2024-08-26 VITALS
DIASTOLIC BLOOD PRESSURE: 78 MMHG | RESPIRATION RATE: 16 BRPM | HEART RATE: 83 BPM | SYSTOLIC BLOOD PRESSURE: 140 MMHG | WEIGHT: 201.5 LBS | OXYGEN SATURATION: 97 % | TEMPERATURE: 97.1 F | BODY MASS INDEX: 28.1 KG/M2

## 2024-08-26 DIAGNOSIS — C61 PROSTATE CANCER: Primary | ICD-10-CM

## 2024-08-26 PROCEDURE — G0463 HOSPITAL OUTPT CLINIC VISIT: HCPCS

## 2024-08-26 NOTE — PROGRESS NOTES
FOLLOW UP NOTE    PATIENT:                                                      Michael Simon  MEDICAL RECORD #:                        [id number]  :                                                          1950  COMPLETION DATE:   2022  DIAGNOSIS:     Prostate cancer  - Stage I (cT1c, cN0, cM0, PSA: 4.3, Grade Group: 1)      BRIEF HISTORY:    Routine follow-up visit for low risk prostate cancer.  He underwent definitive treatment with CyberKnife stereotactic body radiotherapy.  He tolerated treatment well.  Acute grade 1 urinary side effects appropriately resolved following treatment.  At this point, he reports an IPSS score of 10 citing predominant symptom of urinary urgency and nocturia x 3.  He reports he is overall pleased with his quality of life due to urinary symptoms.  He denies gross hematuria, dysuria, or urinary incontinence.  He does continue to take Flomax every other day with good effect.  He reports bowel function is normal.  No acute GI complaints.  He reports erectile function is overall fairly good.  He is uninterested in ED medication at this time.  No new or focal bony pains.  No unexplained weight loss.  Overall, he feels well.  Post-treatment PSA has continued to decline from 2.19 NG/mL on 3/15/2023 to 0.8 NG/mL on 2023 to 0.41 NG/mL on 3/13/2024.       IPSS Questionnaire (AUA-7):  Over the past month…    1)  Incomplete Emptying  How often have you had a sensation of not emptying your bladder?  1 - Less than 1 time in 5   2)  Frequency  How often have you had to urinate less than every two hours? 1 - Less than 1 time in 5   3)  Intermittency  How often have you found you stopped and started again several times when you urinated?  1 - Less than 1 time in 5   4) Urgency  How often have you found it difficult to postpone urination?  3 - About half the time   5) Weak Stream  How often have you had a weak urinary stream?  1 - Less than 1 time in 5   6) Straining  How often  have you had to push or strain to begin urination?  0 - Not at all   7) Nocturia  How many times did you typically get up at night to urinate?  3 - 3 times   Total Score:  10       Quality of life due to urinary symptoms:  If you were to spend the rest of your life with your urinary condition the way it is now, how would you feel about that? 1-Pleased   Urine Leakage (Incontinence) 1-Mild (A few drops a day, no pad use)     Sexual Health Inventory  Current Status    1)  How do you rate your confidence that you could achieve and keep an erection? 4-High   2) When you had erections with sexual stimulation, how often were your erections hard enough for penetration (entering your partner)? 4-Most times (much more than half the time)   3)  During sexual intercourse, how often were you able to maintain your erection after you had penetrated (entered) into your partner? 5-Almost always or always   4) During sexual intercourse, how difficult was it to maintain your erection to completion of intercourse? 5-Not difficult   5) When you attempted sexual intercourse, how often was it satisfactory to you? 5-Almost always or always   Total Score: 22       Bowel Health Inventory  Current Status: 0-No problems, no rectal bleeding, no discharge, less then 5 bowel movements a day         MEDICATIONS: Medication reconciliation for the patient was reviewed and confirmed in the electronic medical record.    Review of Systems   Genitourinary:  Positive for nocturia (x3).    All other systems reviewed and are negative.    KPS 90%        Physical Exam  Vitals and nursing note reviewed.   Constitutional:       General: He is not in acute distress.     Appearance: Normal appearance. He is well-developed.   HENT:      Head: Normocephalic and atraumatic.   Eyes:      Conjunctiva/sclera: Conjunctivae normal.      Pupils: Pupils are equal, round, and reactive to light.   Cardiovascular:      Rate and Rhythm: Normal rate and regular rhythm.    Pulmonary:      Effort: Pulmonary effort is normal. No respiratory distress.      Breath sounds: Normal breath sounds.   Musculoskeletal:         General: Normal range of motion.   Skin:     General: Skin is warm and dry.   Neurological:      Mental Status: He is alert and oriented to person, place, and time.   Psychiatric:         Behavior: Behavior normal.         Thought Content: Thought content normal.         Judgment: Judgment normal.         VITAL SIGNS:   Vitals:    08/26/24 1135   BP: 140/78   Pulse: 83   Resp: 16   Temp: 97.1 °F (36.2 °C)   TempSrc: Temporal   SpO2: 97%   Weight: 91.4 kg (201 lb 8 oz)   PainSc: 0-No pain               The following portions of the patient's history were reviewed and updated as appropriate: allergies, current medications, past family history, past medical history, past social history, past surgical history and problem list.         Diagnoses and all orders for this visit:    1. Prostate cancer (Primary)         IMPRESSION:  Prostate cancer, Shilpi's 3+3=6, clinical stage I (T1c, N0, M0), PSA 4.33 ng/ml.  1 year + 8 months status post CyberKnife SBRT.  He tolerated treatment well.  No symptomology to suggest late radiation related toxicities at this point.  He continues Flomax QOD for management of mild/tolerable LUTS.  He has had an excellent biochemical response to treatment with PSA yohana value of 0.41 NG/mL, indicating biochemical disease remission status.  Prognosis should continue to remain excellent at this point.  We discussed ongoing recommendations, including serial PSA monitoring every 6 to 12 months per urology, with appropriate follow-up with Dr. Johnston scheduled.      RECOMMENDATIONS:  Continue routine urologic surveillance and serial PSA monitoring.  Return to clinic as needed.  We discussed that in the event of 2 consecutive rising PSA values that exceed 2 points above the patient's yohana value (>2.41 NG/mL), then we request referral back to our clinic at  that time for evaluation.    Return if symptoms worsen or fail to improve, for Office Visit.    AGNES Lopez      I spent a total of 39 minutes on today's visit, with more than 15 minutes in direct face to face communication, and the remainder of the time spent in reviewing the relevant history, records, available imaging, and for documentation.